# Patient Record
Sex: MALE | Race: BLACK OR AFRICAN AMERICAN | NOT HISPANIC OR LATINO | Employment: PART TIME | URBAN - METROPOLITAN AREA
[De-identification: names, ages, dates, MRNs, and addresses within clinical notes are randomized per-mention and may not be internally consistent; named-entity substitution may affect disease eponyms.]

---
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
## 2021-03-14 ENCOUNTER — HOSPITAL ENCOUNTER (EMERGENCY)
Facility: HOSPITAL | Age: 21
Discharge: HOME | End: 2021-03-14
Attending: EMERGENCY MEDICINE | Admitting: EMERGENCY MEDICINE
Payer: COMMERCIAL

## 2021-03-14 ENCOUNTER — APPOINTMENT (EMERGENCY)
Dept: ULTRASOUND IMAGING | Facility: HOSPITAL | Age: 21
End: 2021-03-14
Payer: COMMERCIAL

## 2021-03-14 VITALS
BODY MASS INDEX: 26.21 KG/M2 | RESPIRATION RATE: 16 BRPM | TEMPERATURE: 97.8 F | OXYGEN SATURATION: 100 % | HEIGHT: 67 IN | WEIGHT: 167 LBS | HEART RATE: 92 BPM | SYSTOLIC BLOOD PRESSURE: 117 MMHG | DIASTOLIC BLOOD PRESSURE: 55 MMHG

## 2021-03-14 DIAGNOSIS — R10.13 EPIGASTRIC PAIN: ICD-10-CM

## 2021-03-14 DIAGNOSIS — R11.2 NAUSEA AND VOMITING: Primary | ICD-10-CM

## 2021-03-14 DIAGNOSIS — F12.90 MARIJUANA USE: ICD-10-CM

## 2021-03-14 LAB
ALBUMIN SERPL BCP-MCNC: 5 G/DL (ref 3.4–4.8)
ALP SERPL-CCNC: 43.4 U/L (ref 10–129)
ALT SERPL W P-5'-P-CCNC: 22 U/L (ref 5–63)
ANION GAP SERPL CALCULATED.3IONS-SCNC: 10 MMOL/L (ref 4–13)
AST SERPL W P-5'-P-CCNC: 20 U/L (ref 15–41)
BACTERIA UR QL AUTO: ABNORMAL /HPF
BASOPHILS # BLD AUTO: 0.01 THOUSANDS/ΜL (ref 0–0.1)
BASOPHILS NFR BLD AUTO: 0 % (ref 0–1)
BILIRUB SERPL-MCNC: 0.87 MG/DL (ref 0.3–1.2)
BILIRUB UR QL STRIP: NEGATIVE
BUN SERPL-MCNC: 19 MG/DL (ref 6–20)
CALCIUM SERPL-MCNC: 10.3 MG/DL (ref 8.4–10.2)
CHLORIDE SERPL-SCNC: 104 MMOL/L (ref 96–108)
CLARITY UR: CLEAR
CO2 SERPL-SCNC: 25 MMOL/L (ref 22–33)
COLOR UR: YELLOW
CREAT SERPL-MCNC: 1.17 MG/DL (ref 0.5–1.2)
EOSINOPHIL # BLD AUTO: 0.03 THOUSAND/ΜL (ref 0–0.61)
EOSINOPHIL NFR BLD AUTO: 0 % (ref 0–6)
ERYTHROCYTE [DISTWIDTH] IN BLOOD BY AUTOMATED COUNT: 13.4 % (ref 11.6–15.1)
GFR SERPL CREATININE-BSD FRML MDRD: 102 ML/MIN/1.73SQ M
GLUCOSE SERPL-MCNC: 119 MG/DL (ref 65–140)
GLUCOSE UR STRIP-MCNC: NEGATIVE MG/DL
HCT VFR BLD AUTO: 45.7 % (ref 36.5–49.3)
HGB BLD-MCNC: 15.2 G/DL (ref 12–17)
HGB UR QL STRIP.AUTO: NEGATIVE
IMM GRANULOCYTES # BLD AUTO: 0.02 THOUSAND/UL (ref 0–0.2)
IMM GRANULOCYTES NFR BLD AUTO: 0 % (ref 0–2)
KETONES UR STRIP-MCNC: ABNORMAL MG/DL
LEUKOCYTE ESTERASE UR QL STRIP: NEGATIVE
LIPASE SERPL-CCNC: 73 U/L (ref 13–60)
LYMPHOCYTES # BLD AUTO: 0.32 THOUSANDS/ΜL (ref 0.6–4.47)
LYMPHOCYTES NFR BLD AUTO: 3 % (ref 14–44)
MCH RBC QN AUTO: 28.7 PG (ref 26.8–34.3)
MCHC RBC AUTO-ENTMCNC: 33.3 G/DL (ref 31.4–37.4)
MCV RBC AUTO: 86 FL (ref 82–98)
MONOCYTES # BLD AUTO: 0.38 THOUSAND/ΜL (ref 0.17–1.22)
MONOCYTES NFR BLD AUTO: 4 % (ref 4–12)
MUCOUS THREADS UR QL AUTO: ABNORMAL
NEUTROPHILS # BLD AUTO: 9 THOUSANDS/ΜL (ref 1.85–7.62)
NEUTS SEG NFR BLD AUTO: 93 % (ref 43–75)
NITRITE UR QL STRIP: NEGATIVE
NON-SQ EPI CELLS URNS QL MICRO: ABNORMAL /HPF
PH UR STRIP.AUTO: 5.5 [PH]
PLATELET # BLD AUTO: 224 THOUSANDS/UL (ref 149–390)
PMV BLD AUTO: 9.5 FL (ref 8.9–12.7)
POTASSIUM SERPL-SCNC: 4 MMOL/L (ref 3.5–5)
PROT SERPL-MCNC: 8.1 G/DL (ref 6.4–8.3)
PROT UR STRIP-MCNC: NEGATIVE MG/DL
RBC # BLD AUTO: 5.29 MILLION/UL (ref 3.88–5.62)
RBC #/AREA URNS AUTO: ABNORMAL /HPF
SODIUM SERPL-SCNC: 139 MMOL/L (ref 133–145)
SP GR UR STRIP.AUTO: 1.02 (ref 1–1.03)
UROBILINOGEN UR QL STRIP.AUTO: 0.2 E.U./DL
WBC # BLD AUTO: 9.76 THOUSAND/UL (ref 4.31–10.16)
WBC #/AREA URNS AUTO: ABNORMAL /HPF

## 2021-03-14 PROCEDURE — 85025 COMPLETE CBC W/AUTO DIFF WBC: CPT | Performed by: EMERGENCY MEDICINE

## 2021-03-14 PROCEDURE — 76870 US EXAM SCROTUM: CPT

## 2021-03-14 PROCEDURE — 96375 TX/PRO/DX INJ NEW DRUG ADDON: CPT

## 2021-03-14 PROCEDURE — 96361 HYDRATE IV INFUSION ADD-ON: CPT

## 2021-03-14 PROCEDURE — 80053 COMPREHEN METABOLIC PANEL: CPT | Performed by: EMERGENCY MEDICINE

## 2021-03-14 PROCEDURE — 99284 EMERGENCY DEPT VISIT MOD MDM: CPT | Performed by: EMERGENCY MEDICINE

## 2021-03-14 PROCEDURE — 81001 URINALYSIS AUTO W/SCOPE: CPT | Performed by: EMERGENCY MEDICINE

## 2021-03-14 PROCEDURE — 36415 COLL VENOUS BLD VENIPUNCTURE: CPT | Performed by: EMERGENCY MEDICINE

## 2021-03-14 PROCEDURE — 83690 ASSAY OF LIPASE: CPT | Performed by: EMERGENCY MEDICINE

## 2021-03-14 PROCEDURE — 99284 EMERGENCY DEPT VISIT MOD MDM: CPT

## 2021-03-14 PROCEDURE — 96374 THER/PROPH/DIAG INJ IV PUSH: CPT

## 2021-03-14 RX ORDER — ONDANSETRON 2 MG/ML
4 INJECTION INTRAMUSCULAR; INTRAVENOUS ONCE
Status: COMPLETED | OUTPATIENT
Start: 2021-03-14 | End: 2021-03-14

## 2021-03-14 RX ORDER — MORPHINE SULFATE 4 MG/ML
4 INJECTION, SOLUTION INTRAMUSCULAR; INTRAVENOUS ONCE
Status: COMPLETED | OUTPATIENT
Start: 2021-03-14 | End: 2021-03-14

## 2021-03-14 RX ORDER — ONDANSETRON 4 MG/1
4 TABLET, ORALLY DISINTEGRATING ORAL EVERY 6 HOURS PRN
Qty: 12 TABLET | Refills: 0 | Status: SHIPPED | OUTPATIENT
Start: 2021-03-14

## 2021-03-14 RX ORDER — PANTOPRAZOLE SODIUM 40 MG/1
40 TABLET, DELAYED RELEASE ORAL DAILY
Qty: 30 TABLET | Refills: 0 | Status: SHIPPED | OUTPATIENT
Start: 2021-03-14

## 2021-03-14 RX ADMIN — MORPHINE SULFATE 4 MG: 4 INJECTION INTRAVENOUS at 03:37

## 2021-03-14 RX ADMIN — ONDANSETRON 4 MG: 2 INJECTION INTRAMUSCULAR; INTRAVENOUS at 03:35

## 2021-03-14 RX ADMIN — SODIUM CHLORIDE 1000 ML: 0.9 INJECTION, SOLUTION INTRAVENOUS at 03:38

## 2021-03-14 NOTE — ED PROVIDER NOTES
History  Chief Complaint   Patient presents with    Vomiting     Patient reports vomiting since earlier this evening accompanied by abdominal pain     History of left testicular torsion requiring surgery 9 years ago  Patient presents with left lower quadrant pain, radiates to the left upper quadrant  States that the lower quadrant pain does feel similar to his torsion although he does not have any testicular pain at this time  It does get so bad he vomits, has helped is time to come to the emergency department because he started vomiting more consistently  Symptoms have been coming and going since the afternoon  No fevers or chills, no diarrhea, no urinary changes  None       Past Medical History:   Diagnosis Date    Heart murmur        Past Surgical History:   Procedure Laterality Date    REDUCTION OF TORSION OF TESTIS  2011       Family History   Problem Relation Age of Onset    Rheum arthritis Mother     Breast cancer Maternal Grandmother     Breast cancer Family      I have reviewed and agree with the history as documented  E-Cigarette/Vaping    E-Cigarette Use Never User      E-Cigarette/Vaping Substances     Social History     Tobacco Use    Smoking status: Never Smoker    Smokeless tobacco: Never Used   Substance Use Topics    Alcohol use: Never     Frequency: Never    Drug use: Yes     Types: Marijuana       Review of Systems   All other systems reviewed and are negative  Physical Exam  Physical Exam  Vitals signs and nursing note reviewed  Constitutional:       Comments: Appears uncomfortable   HENT:      Head: Normocephalic and atraumatic  Nose: Nose normal       Mouth/Throat:      Mouth: Mucous membranes are moist    Eyes:      Extraocular Movements: Extraocular movements intact  Pupils: Pupils are equal, round, and reactive to light  Cardiovascular:      Rate and Rhythm: Normal rate and regular rhythm  Pulses: Normal pulses        Heart sounds: Normal heart sounds  Pulmonary:      Effort: Pulmonary effort is normal       Breath sounds: Normal breath sounds  No wheezing, rhonchi or rales  Abdominal:      General: Abdomen is flat  There is no distension  Palpations: Abdomen is soft  Tenderness: There is no rebound  Comments: Tenderness with guarding left lower quadrant, left upper quadrant   Genitourinary:     Penis: Normal        Comments: Normal testicular lie, no gross size difference, left testicle has tenderness of the posterior aspect, nontender anterior or on the sides, patient does have pain with traction on the testicle but most of his pain is with palpation along the epididymis  He states the pain does radiate up into his left lower quadrant this does feel like his prior to origin although less severe than it was in the past   right testicle is nontender  Musculoskeletal:      Right lower leg: No edema  Left lower leg: No edema  Skin:     General: Skin is warm and dry  Capillary Refill: Capillary refill takes less than 2 seconds  Neurological:      General: No focal deficit present  Mental Status: He is alert and oriented to person, place, and time  Sensory: No sensory deficit  Motor: No weakness        Gait: Gait normal    Psychiatric:         Mood and Affect: Mood normal          Behavior: Behavior normal          Vital Signs  ED Triage Vitals [03/14/21 0139]   Temperature Pulse Respirations Blood Pressure SpO2   97 8 °F (36 6 °C) 83 18 127/77 99 %      Temp Source Heart Rate Source Patient Position - Orthostatic VS BP Location FiO2 (%)   Oral Monitor Sitting Right arm --      Pain Score       9           Vitals:    03/14/21 0139 03/14/21 0638   BP: 127/77 117/55   Pulse: 83 92   Patient Position - Orthostatic VS: Sitting Lying         Visual Acuity      ED Medications  Medications   morphine (PF) 4 mg/mL injection 4 mg (4 mg Intravenous Given 3/14/21 0337)   ondansetron (ZOFRAN) injection 4 mg (4 mg Intravenous Given 3/14/21 0335)   sodium chloride 0 9 % bolus 1,000 mL (0 mL Intravenous Stopped 3/14/21 0625)       Diagnostic Studies  Results Reviewed     Procedure Component Value Units Date/Time    Urinalysis with microscopic [[id number]]  (Abnormal) Collected: 03/14/21 0625    Lab Status: Final result Specimen: Urine, Clean Catch Updated: 03/14/21 0707     Clarity, UA Clear     Color, UA Yellow     Specific Gravity, UA 1 025     pH, UA 5 5     Glucose, UA Negative mg/dl      Ketones, UA Trace mg/dl      Blood, UA Negative     Protein, UA Negative mg/dl      Nitrite, UA Negative     Bilirubin, UA Negative     Urobilinogen, UA 0 2 E U /dl      Leukocytes, UA Negative     WBC, UA 0-1 /hpf      RBC, UA 0-1 /hpf      Bacteria, UA None Seen /hpf      Epithelial Cells Occasional /hpf      MUCUS THREADS Moderate    CBC and differential [[id number]]  (Abnormal) Collected: 03/14/21 0322    Lab Status: Final result Specimen: Blood from Arm, Right Updated: 03/14/21 0418     WBC 9 76 Thousand/uL      RBC 5 29 Million/uL      Hemoglobin 15 2 g/dL      Hematocrit 45 7 %      MCV 86 fL      MCH 28 7 pg      MCHC 33 3 g/dL      RDW 13 4 %      MPV 9 5 fL      Platelets 808 Thousands/uL      Neutrophils Relative 93 %      Immat GRANS % 0 %      Lymphocytes Relative 3 %      Monocytes Relative 4 %      Eosinophils Relative 0 %      Basophils Relative 0 %      Neutrophils Absolute 9 00 Thousands/µL      Immature Grans Absolute 0 02 Thousand/uL      Lymphocytes Absolute 0 32 Thousands/µL      Monocytes Absolute 0 38 Thousand/µL      Eosinophils Absolute 0 03 Thousand/µL      Basophils Absolute 0 01 Thousands/µL     Narrative: This is an appended report  These results have been appended to a previously verified report      Comprehensive metabolic panel [[id number]]  (Abnormal) Collected: 03/14/21 0322    Lab Status: Final result Specimen: Blood from Arm, Right Updated: 03/14/21 0350     Sodium 139 mmol/L      Potassium 4 0 mmol/L Chloride 104 mmol/L      CO2 25 mmol/L      ANION GAP 10 mmol/L      BUN 19 mg/dL      Creatinine 1 17 mg/dL      Glucose 119 mg/dL      Calcium 10 3 mg/dL      AST 20 U/L      ALT 22 U/L      Alkaline Phosphatase 43 4 U/L      Total Protein 8 1 g/dL      Albumin 5 0 g/dL      Total Bilirubin 0 87 mg/dL      eGFR 102 ml/min/1 73sq m     Narrative:      [place] guidelines for Chronic Kidney Disease (CKD):     Stage 1 with normal or high GFR (GFR > 90 mL/min/1 73 square meters)    Stage 2 Mild CKD (GFR = 60-89 mL/min/1 73 square meters)    Stage 3A Moderate CKD (GFR = 45-59 mL/min/1 73 square meters)    Stage 3B Moderate CKD (GFR = 30-44 mL/min/1 73 square meters)    Stage 4 Severe CKD (GFR = 15-29 mL/min/1 73 square meters)    Stage 5 End Stage CKD (GFR <15 mL/min/1 73 square meters)  Note: GFR calculation is accurate only with a steady state creatinine    Lipase [[id number]]  (Abnormal) Collected: 03/14/21 0322    Lab Status: Final result Specimen: Blood from Arm, Right Updated: 03/14/21 0350     Lipase 73 u/L                  US scrotum and testicles   Final Result by Becca Hassan MD (03/14 0546)       Asymmetry of the left testicular parenchyma without decreased or increased flow; I suspect this is secondary to prior torsion however clinical correlation is advised  Otherwise unremarkable ultrasound of the testicles  Symmetric flow is noted within the testicles  Epididymides are symmetric  I personally discussed this study with Nicol Wilcox on 3/14/2021 at 5:33 AM           Workstation performed: [id number]                 Procedures  Procedures         ED Course  ED Course as of Mar 15 0801   Jeanetteyolanda Castlesusanna Mar 14, 2021   6988 Normal white blood cell count, hemoglobin and platelets  Normal electrolytes and kidney function  Normal liver function testing  Lipase very mildly elevated at 73      0632 Ultrasound is negative for acute injury, patient does have some changes consistent with scarring from his prior surgery  Discussed results with patient, he is feeling completely better after medications here  No pain remains in his abdomen or his testicles  Discussed results, patient will give a urine sample to see if there is infectious cause of his pain  On repeat abdominal exam, he is soft and nontender to palpation  MDM    Disposition  Final diagnoses:   Nausea and vomiting   Epigastric pain   Marijuana use     Time reflects when diagnosis was documented in both MDM as applicable and the Disposition within this note     Time User Action Codes Description Comment    3/14/2021  7:25 AM Juan An Add [R11 2] Nausea and vomiting     3/14/2021  7:25 AM Giulia Crest Add [R10 13] Epigastric pain     3/14/2021  7:25 AM Giulia Crest Add [F12 90] Marijuana use       ED Disposition     ED Disposition Condition Date/Time Comment    Discharge Stable Sun Mar 14, 2021  7:25 AM Marek Al discharge to home/self care  Follow-up Information     Follow up With Specialties Details Why Contact Info    Elsi Sagastume MD Family Medicine In 3 days  [address]   [phone]            Discharge Medication List as of 3/14/2021  7:26 AM      START taking these medications    Details   ondansetron (ZOFRAN-ODT) 4 mg disintegrating tablet Take 1 tablet (4 mg total) by mouth every 6 (six) hours as needed for nausea or vomiting for up to 12 doses, Starting Sun 3/14/2021, Normal      pantoprazole (PROTONIX) 40 mg tablet Take 1 tablet (40 mg total) by mouth daily, Starting Sun 3/14/2021, Normal           No discharge procedures on file      PDMP Review     None          ED Provider  Electronically Signed by           Ivana Gonzalez DO  03/15/21 3231

## 2021-03-14 NOTE — ED CARE HANDOFF
Emergency Department Sign Out Note        Sign out and transfer of care from Dr Mckenna Brooks  See Separate Emergency Department note  The patient, Ciera Colindres, was evaluated by the previous provider for vomiting and abd pain  Workup Completed:  Scrotal US, labs, urine    ED Course / Workup Pending (followup): Workup unremarkable  Repeat abd exam normal, labs and urine, US wnl  Likely gastroenteritis vs  Marijuana hyperemesis syndrome  Stable for d/c home  ED Course as of Mar 14 1135   Mina Potter Mar 14, 2021   0719 Patient signed out to me, symptoms resolved, urine negative, abdomen nontender for me on repeat abd exam   He does admit to marijuana use daily, discussed this may be related to it  Advised to stop or at least cut back marijuana use  Procedures  MDM    Disposition  Final diagnoses:   Nausea and vomiting   Epigastric pain   Marijuana use     Time reflects when diagnosis was documented in both MDM as applicable and the Disposition within this note     Time User Action Codes Description Comment    3/14/2021  7:25 AM Janelle Oneill Add [R11 2] Nausea and vomiting     3/14/2021  7:25 AM Clif Parr Add [R10 13] Epigastric pain     3/14/2021  7:25 AM Clif Parr Add [F12 90] Marijuana use       ED Disposition     ED Disposition Condition Date/Time Comment    Discharge Stable Sun Mar 14, 2021  7:25 AM Ciera Colindres discharge to home/self care              Follow-up Information     Follow up With Specialties Details Why Contact Info    Alonso Lizama MD Family Medicine In 3 days  [address]   [phone]          Discharge Medication List as of 3/14/2021  7:26 AM      START taking these medications    Details   ondansetron (ZOFRAN-ODT) 4 mg disintegrating tablet Take 1 tablet (4 mg total) by mouth every 6 (six) hours as needed for nausea or vomiting for up to 12 doses, Starting Sun 3/14/2021, Normal      pantoprazole (PROTONIX) 40 mg tablet Take 1 tablet (40 mg total) by mouth daily, Starting Sun 3/14/2021, Normal           No discharge procedures on file         ED Provider  Electronically Signed by     Moriah Rivera DO  03/14/21 4771

## 2021-06-17 ENCOUNTER — HOSPITAL ENCOUNTER (EMERGENCY)
Facility: HOSPITAL | Age: 21
Discharge: HOME | End: 2021-06-17
Payer: COMMERCIAL

## 2021-06-17 VITALS
TEMPERATURE: 99.4 F | HEIGHT: 68 IN | HEART RATE: 91 BPM | SYSTOLIC BLOOD PRESSURE: 117 MMHG | RESPIRATION RATE: 16 BRPM | BODY MASS INDEX: 25.13 KG/M2 | DIASTOLIC BLOOD PRESSURE: 69 MMHG | OXYGEN SATURATION: 95 % | WEIGHT: 165.79 LBS

## 2021-06-17 DIAGNOSIS — Z20.822 SUSPECTED COVID-19 VIRUS INFECTION: ICD-10-CM

## 2021-06-17 DIAGNOSIS — Z20.822 ENCOUNTER FOR LABORATORY TESTING FOR COVID-19 VIRUS: Primary | ICD-10-CM

## 2021-06-17 LAB — SARS-COV-2 RNA RESP QL NAA+PROBE: NEGATIVE

## 2021-06-17 PROCEDURE — 99282 EMERGENCY DEPT VISIT SF MDM: CPT

## 2021-06-17 PROCEDURE — 99282 EMERGENCY DEPT VISIT SF MDM: CPT | Performed by: PHYSICIAN ASSISTANT

## 2021-06-17 PROCEDURE — 87635 SARS-COV-2 COVID-19 AMP PRB: CPT | Performed by: PHYSICIAN ASSISTANT

## 2021-06-17 NOTE — ED PROVIDER NOTES
History  Chief Complaint   Patient presents with    Labs Only     Pt here for COVID testing since girlfriend lost sense of taste  Pt reports sore throat, aches, and chills  Patient is a 44-year-old male presenting to the ED for a COVID test   Patient has had 2 days of a sore throat, cough, body aches, chills and congestion  Yesterday his girlfriend lost her sense of taste and he thinks she might have COVID  Patient said that his symptoms already starting to improve  He denies fevers, chest pain, shortness of breath, abdominal pain, nausea, vomiting, diarrhea constipation  Patient is requesting a COVID test only  Prior to Admission Medications   Prescriptions Last Dose Informant Patient Reported? Taking?   ondansetron (ZOFRAN-ODT) 4 mg disintegrating tablet Not Taking at Unknown time  No No   Sig: Take 1 tablet (4 mg total) by mouth every 6 (six) hours as needed for nausea or vomiting for up to 12 doses   Patient not taking: Reported on 6/17/2021   pantoprazole (PROTONIX) 40 mg tablet Not Taking at Unknown time  No No   Sig: Take 1 tablet (40 mg total) by mouth daily   Patient not taking: Reported on 6/17/2021      Facility-Administered Medications: None       Past Medical History:   Diagnosis Date    Heart murmur        Past Surgical History:   Procedure Laterality Date    REDUCTION OF TORSION OF TESTIS  2011       Family History   Problem Relation Age of Onset    Rheum arthritis Mother     Breast cancer Maternal Grandmother     Breast cancer Family      I have reviewed and agree with the history as documented      E-Cigarette/Vaping    E-Cigarette Use Never User      E-Cigarette/Vaping Substances     Social History     Tobacco Use    Smoking status: Never Smoker    Smokeless tobacco: Never Used   Vaping Use    Vaping Use: Never used   Substance Use Topics    Alcohol use: Never    Drug use: Yes     Types: Marijuana     Comment: daily       Review of Systems   Constitutional: Positive for chills  Negative for diaphoresis, fatigue and fever  HENT: Positive for congestion and sore throat  Negative for ear pain, mouth sores, rhinorrhea, sinus pain and trouble swallowing  Eyes: Negative for photophobia and visual disturbance  Respiratory: Positive for cough  Negative for chest tightness, shortness of breath and wheezing  Cardiovascular: Negative for chest pain, palpitations and leg swelling  Gastrointestinal: Negative for abdominal pain, blood in stool, constipation, diarrhea, nausea and vomiting  Genitourinary: Negative for dysuria, flank pain, frequency, hematuria and urgency  Musculoskeletal: Positive for myalgias  Negative for arthralgias, back pain, gait problem, joint swelling and neck pain  Skin: Negative for color change, pallor and rash  Neurological: Negative for dizziness, syncope, speech difficulty, weakness, light-headedness, numbness and headaches  Psychiatric/Behavioral: Negative for confusion and sleep disturbance  All other systems reviewed and are negative  Physical Exam  Physical Exam  Vitals and nursing note reviewed  Constitutional:       General: He is awake  He is not in acute distress  Appearance: Normal appearance  He is well-developed  He is not ill-appearing or diaphoretic  HENT:      Head: Normocephalic and atraumatic  Right Ear: External ear normal       Left Ear: External ear normal       Nose: Nose normal       Mouth/Throat:      Lips: Pink  Mouth: Mucous membranes are moist       Pharynx: Oropharynx is clear  Uvula midline  No pharyngeal swelling, oropharyngeal exudate or posterior oropharyngeal erythema  Comments: No pharyngeal erythema or exudate  Uvula midline  Eyes:      General: Lids are normal  No scleral icterus  Conjunctiva/sclera: Conjunctivae normal       Pupils: Pupils are equal, round, and reactive to light  Cardiovascular:      Rate and Rhythm: Normal rate and regular rhythm        Pulses: Normal pulses  Radial pulses are 2+ on the right side and 2+ on the left side  Heart sounds: Normal heart sounds, S1 normal and S2 normal    Pulmonary:      Effort: Pulmonary effort is normal  No tachypnea, accessory muscle usage or respiratory distress  Breath sounds: Normal breath sounds  No stridor  No decreased breath sounds, wheezing, rhonchi or rales  Comments: Lungs clear and equal to auscultation bilaterally  Abdominal:      General: Abdomen is flat  Bowel sounds are normal  There is no distension  Palpations: Abdomen is soft  Tenderness: There is no abdominal tenderness  There is no right CVA tenderness, left CVA tenderness, guarding or rebound  Musculoskeletal:      Cervical back: Full passive range of motion without pain, normal range of motion and neck supple  No signs of trauma  No pain with movement  Normal range of motion  Right lower leg: No edema  Left lower leg: No edema  Lymphadenopathy:      Cervical: No cervical adenopathy  Skin:     General: Skin is warm and dry  Capillary Refill: Capillary refill takes less than 2 seconds  Coloration: Skin is not cyanotic, jaundiced or pale  Neurological:      Mental Status: He is alert and oriented to person, place, and time  GCS: GCS eye subscore is 4  GCS verbal subscore is 5  GCS motor subscore is 6  Cranial Nerves: No dysarthria or facial asymmetry  Gait: Gait normal    Psychiatric:         Attention and Perception: Attention normal          Mood and Affect: Mood normal          Speech: Speech normal          Behavior: Behavior normal  Behavior is cooperative           Vital Signs  ED Triage Vitals [06/17/21 1901]   Temperature Pulse Respirations Blood Pressure SpO2   99 4 °F (37 4 °C) 91 16 117/69 95 %      Temp Source Heart Rate Source Patient Position - Orthostatic VS BP Location FiO2 (%)   Oral Monitor Lying Left arm --      Pain Score       --           Vitals:    06/17/21 1901 BP: 117/69   Pulse: 91   Patient Position - Orthostatic VS: Lying         Visual Acuity      ED Medications  Medications - No data to display    Diagnostic Studies  Results Reviewed     Procedure Component Value Units Date/Time    Novel Coronavirus (Covid-19),PCR [facility] - 24 Hour Routine [[id number]] Collected: 06/17/21 1915    Lab Status: In process Specimen: Nares from Nasopharyngeal Swab Updated: 06/17/21 1921                 No orders to display              Procedures  Procedures         ED Course                             SBIRT 20yo+      Most Recent Value   SBIRT (23 yo +)   In order to provide better care to our patients, we are screening all of our patients for alcohol and drug use  Would it be okay to ask you these screening questions? No Filed at: 06/17/2021 1914                    MDM  Number of Diagnoses or Management Options  Encounter for laboratory testing for COVID-19 virus  Suspected COVID-19 virus infection  Diagnosis management comments: Patient is a 26-year-old male presenting to the ED for a COVID test   COVID swab obtained  Quarantine precautions discussed in detail  The management plan was discussed in detail with the patient at bedside and all questions were answered  Prior to discharge, verbal and written instructions provided  ED return precautions discussed in detail  The patient verbalized understanding of our discussion and plan of care, and agrees to return to the Emergency Department for concerns and progression of illness         Amount and/or Complexity of Data Reviewed  Clinical lab tests: ordered    Patient Progress  Patient progress: stable      Disposition  Final diagnoses:   Encounter for laboratory testing for COVID-19 virus   Suspected COVID-19 virus infection     Time reflects when diagnosis was documented in both MDM as applicable and the Disposition within this note     Time User Action Codes Description Comment    6/17/2021  7:15 PM Randi Clark Add [Z20 232] Encounter for laboratory testing for COVID-19 virus     6/17/2021  7:16 PM Yuri Rahman [Z20 822] Suspected COVID-19 virus infection       ED Disposition     ED Disposition Condition Date/Time Comment    Discharge Stable u Jun 17, 2021  7:15 PM Gillian Enrique discharge to home/self care  Follow-up Information     Follow up With Specialties Details Why Contact Info Additional Information    Grisel Leblanc MD Family Medicine  As needed [address] [facility] [place]  [address] [facility]       [address] Emergency Department Emergency Medicine  If symptoms worsen [address] [facility],[address] [id number]  [facility] Emergency Department, [address], [address]          Discharge Medication List as of 6/17/2021  7:18 PM      CONTINUE these medications which have NOT CHANGED    Details   ondansetron (ZOFRAN-ODT) 4 mg disintegrating tablet Take 1 tablet (4 mg total) by mouth every 6 (six) hours as needed for nausea or vomiting for up to 12 doses, Starting Sun 3/14/2021, Normal      pantoprazole (PROTONIX) 40 mg tablet Take 1 tablet (40 mg total) by mouth daily, Starting Sun 3/14/2021, Normal           No discharge procedures on file      PDMP Review     None          ED Provider  Electronically Signed by           Rizwana Gunn PA-C  06/17/21 8282

## 2021-06-17 NOTE — Clinical Note
Kirti Bert was seen and treated in our emergency department on 6/17/2021  Diagnosis:     Juan Manuel An    He may return on this date: If you have any questions or concerns, please don't hesitate to call        Deni Hooker PA-C    ______________________________           _______________          _______________  Hospital Representative                              Date                                Time

## 2023-08-26 ENCOUNTER — HOSPITAL ENCOUNTER (EMERGENCY)
Facility: HOSPITAL | Age: 23
Discharge: HOME | End: 2023-08-26
Attending: EMERGENCY MEDICINE
Payer: COMMERCIAL

## 2023-08-26 ENCOUNTER — OFFICE VISIT (OUTPATIENT)
Dept: URGENT CARE | Facility: MEDICAL CENTER | Age: 23
End: 2023-08-26
Payer: COMMERCIAL

## 2023-08-26 ENCOUNTER — APPOINTMENT (EMERGENCY)
Dept: NON INVASIVE DIAGNOSTICS | Facility: HOSPITAL | Age: 23
End: 2023-08-26
Payer: COMMERCIAL

## 2023-08-26 ENCOUNTER — APPOINTMENT (EMERGENCY)
Dept: RADIOLOGY | Facility: HOSPITAL | Age: 23
End: 2023-08-26
Payer: COMMERCIAL

## 2023-08-26 VITALS
SYSTOLIC BLOOD PRESSURE: 148 MMHG | DIASTOLIC BLOOD PRESSURE: 88 MMHG | TEMPERATURE: 97.9 F | OXYGEN SATURATION: 99 % | RESPIRATION RATE: 20 BRPM | HEART RATE: 72 BPM | BODY MASS INDEX: 25.94 KG/M2 | WEIGHT: 170.6 LBS

## 2023-08-26 VITALS
SYSTOLIC BLOOD PRESSURE: 119 MMHG | HEART RATE: 62 BPM | TEMPERATURE: 97.3 F | OXYGEN SATURATION: 96 % | DIASTOLIC BLOOD PRESSURE: 55 MMHG | RESPIRATION RATE: 15 BRPM

## 2023-08-26 DIAGNOSIS — R74.8 ELEVATED CK: ICD-10-CM

## 2023-08-26 DIAGNOSIS — M79.661 RIGHT CALF PAIN: Primary | ICD-10-CM

## 2023-08-26 DIAGNOSIS — M79.604 RIGHT LEG PAIN: Primary | ICD-10-CM

## 2023-08-26 DIAGNOSIS — R79.89 ELEVATED LFTS: ICD-10-CM

## 2023-08-26 LAB
ALBUMIN SERPL BCP-MCNC: 4.5 G/DL (ref 3.5–5)
ALP SERPL-CCNC: 42 U/L (ref 34–104)
ALT SERPL W P-5'-P-CCNC: 58 U/L (ref 7–52)
ANION GAP SERPL CALCULATED.3IONS-SCNC: 7 MMOL/L
APTT PPP: 25 SECONDS (ref 23–37)
AST SERPL W P-5'-P-CCNC: 162 U/L (ref 13–39)
ATRIAL RATE: 67 BPM
BASOPHILS # BLD AUTO: 0.03 THOUSANDS/ÂΜL (ref 0–0.1)
BASOPHILS NFR BLD AUTO: 1 % (ref 0–1)
BILIRUB DIRECT SERPL-MCNC: 0.1 MG/DL (ref 0–0.2)
BILIRUB SERPL-MCNC: 0.45 MG/DL (ref 0.2–1)
BILIRUB UR QL STRIP: NEGATIVE
BUN SERPL-MCNC: 13 MG/DL (ref 5–25)
CALCIUM SERPL-MCNC: 9.5 MG/DL (ref 8.4–10.2)
CARDIAC TROPONIN I PNL SERPL HS: 13 NG/L (ref 8–18)
CHLORIDE SERPL-SCNC: 103 MMOL/L (ref 96–108)
CK SERPL-CCNC: 8384 U/L (ref 39–308)
CLARITY UR: CLEAR
CO2 SERPL-SCNC: 30 MMOL/L (ref 21–32)
COLOR UR: YELLOW
CREAT SERPL-MCNC: 1.05 MG/DL (ref 0.6–1.3)
D DIMER PPP FEU-MCNC: 0.48 UG/ML FEU
EOSINOPHIL # BLD AUTO: 0.14 THOUSAND/ÂΜL (ref 0–0.61)
EOSINOPHIL NFR BLD AUTO: 4 % (ref 0–6)
ERYTHROCYTE [DISTWIDTH] IN BLOOD BY AUTOMATED COUNT: 12.5 % (ref 11.6–15.1)
GFR SERPL CREATININE-BSD FRML MDRD: 99 ML/MIN/1.73SQ M
GLUCOSE SERPL-MCNC: 78 MG/DL (ref 65–140)
GLUCOSE UR STRIP-MCNC: NEGATIVE MG/DL
HCT VFR BLD AUTO: 43.1 % (ref 36.5–49.3)
HGB BLD-MCNC: 13.8 G/DL (ref 12–17)
HGB UR QL STRIP.AUTO: NEGATIVE
IMM GRANULOCYTES # BLD AUTO: 0 THOUSAND/UL (ref 0–0.2)
IMM GRANULOCYTES NFR BLD AUTO: 0 % (ref 0–2)
INR PPP: 0.91 (ref 0.84–1.19)
KETONES UR STRIP-MCNC: NEGATIVE MG/DL
LEUKOCYTE ESTERASE UR QL STRIP: NEGATIVE
LYMPHOCYTES # BLD AUTO: 1.64 THOUSANDS/ÂΜL (ref 0.6–4.47)
LYMPHOCYTES NFR BLD AUTO: 41 % (ref 14–44)
MCH RBC QN AUTO: 29.2 PG (ref 26.8–34.3)
MCHC RBC AUTO-ENTMCNC: 32 G/DL (ref 31.4–37.4)
MCV RBC AUTO: 91 FL (ref 82–98)
MONOCYTES # BLD AUTO: 0.49 THOUSAND/ÂΜL (ref 0.17–1.22)
MONOCYTES NFR BLD AUTO: 13 % (ref 4–12)
NEUTROPHILS # BLD AUTO: 1.63 THOUSANDS/ÂΜL (ref 1.85–7.62)
NEUTS SEG NFR BLD AUTO: 41 % (ref 43–75)
NITRITE UR QL STRIP: NEGATIVE
NRBC BLD AUTO-RTO: 0 /100 WBCS
P AXIS: 68 DEGREES
PH UR STRIP.AUTO: 6.5 [PH]
PLATELET # BLD AUTO: 260 THOUSANDS/UL (ref 149–390)
PMV BLD AUTO: 9.3 FL (ref 8.9–12.7)
POTASSIUM SERPL-SCNC: 3.7 MMOL/L (ref 3.5–5.3)
PR INTERVAL: 162 MS
PROT SERPL-MCNC: 7.2 G/DL (ref 6.4–8.4)
PROT UR STRIP-MCNC: NEGATIVE MG/DL
PROTHROMBIN TIME: 12.4 SECONDS (ref 11.6–14.5)
QRS AXIS: 65 DEGREES
QRSD INTERVAL: 82 MS
QT INTERVAL: 370 MS
QTC INTERVAL: 390 MS
RBC # BLD AUTO: 4.72 MILLION/UL (ref 3.88–5.62)
SODIUM SERPL-SCNC: 140 MMOL/L (ref 135–147)
SP GR UR STRIP.AUTO: <=1.005 (ref 1–1.03)
T WAVE AXIS: 48 DEGREES
UROBILINOGEN UR QL STRIP.AUTO: 0.2 E.U./DL
VENTRICULAR RATE: 67 BPM
WBC # BLD AUTO: 3.93 THOUSAND/UL (ref 4.31–10.16)

## 2023-08-26 PROCEDURE — 85379 FIBRIN DEGRADATION QUANT: CPT | Performed by: EMERGENCY MEDICINE

## 2023-08-26 PROCEDURE — 93971 EXTREMITY STUDY: CPT

## 2023-08-26 PROCEDURE — 99284 EMERGENCY DEPT VISIT MOD MDM: CPT

## 2023-08-26 PROCEDURE — 82550 ASSAY OF CK (CPK): CPT | Performed by: EMERGENCY MEDICINE

## 2023-08-26 PROCEDURE — 80076 HEPATIC FUNCTION PANEL: CPT | Performed by: EMERGENCY MEDICINE

## 2023-08-26 PROCEDURE — 99285 EMERGENCY DEPT VISIT HI MDM: CPT | Performed by: EMERGENCY MEDICINE

## 2023-08-26 PROCEDURE — 93005 ELECTROCARDIOGRAM TRACING: CPT

## 2023-08-26 PROCEDURE — 81003 URINALYSIS AUTO W/O SCOPE: CPT | Performed by: EMERGENCY MEDICINE

## 2023-08-26 PROCEDURE — 80048 BASIC METABOLIC PNL TOTAL CA: CPT | Performed by: EMERGENCY MEDICINE

## 2023-08-26 PROCEDURE — 93010 ELECTROCARDIOGRAM REPORT: CPT | Performed by: INTERNAL MEDICINE

## 2023-08-26 PROCEDURE — 71045 X-RAY EXAM CHEST 1 VIEW: CPT

## 2023-08-26 PROCEDURE — 85025 COMPLETE CBC W/AUTO DIFF WBC: CPT | Performed by: EMERGENCY MEDICINE

## 2023-08-26 PROCEDURE — 84484 ASSAY OF TROPONIN QUANT: CPT | Performed by: EMERGENCY MEDICINE

## 2023-08-26 PROCEDURE — G0382 LEV 3 HOSP TYPE B ED VISIT: HCPCS | Performed by: PHYSICIAN ASSISTANT

## 2023-08-26 PROCEDURE — 93971 EXTREMITY STUDY: CPT | Performed by: SURGERY

## 2023-08-26 PROCEDURE — 99211 OFF/OP EST MAY X REQ PHY/QHP: CPT | Performed by: PHYSICIAN ASSISTANT

## 2023-08-26 PROCEDURE — 85610 PROTHROMBIN TIME: CPT | Performed by: EMERGENCY MEDICINE

## 2023-08-26 PROCEDURE — 36415 COLL VENOUS BLD VENIPUNCTURE: CPT | Performed by: EMERGENCY MEDICINE

## 2023-08-26 PROCEDURE — 85730 THROMBOPLASTIN TIME PARTIAL: CPT | Performed by: EMERGENCY MEDICINE

## 2023-08-26 PROCEDURE — 96365 THER/PROPH/DIAG IV INF INIT: CPT

## 2023-08-26 RX ORDER — SODIUM CHLORIDE, SODIUM GLUCONATE, SODIUM ACETATE, POTASSIUM CHLORIDE, MAGNESIUM CHLORIDE, SODIUM PHOSPHATE, DIBASIC, AND POTASSIUM PHOSPHATE .53; .5; .37; .037; .03; .012; .00082 G/100ML; G/100ML; G/100ML; G/100ML; G/100ML; G/100ML; G/100ML
1000 INJECTION, SOLUTION INTRAVENOUS ONCE
Status: COMPLETED | OUTPATIENT
Start: 2023-08-26 | End: 2023-08-26

## 2023-08-26 RX ADMIN — SODIUM CHLORIDE, SODIUM GLUCONATE, SODIUM ACETATE, POTASSIUM CHLORIDE, MAGNESIUM CHLORIDE, SODIUM PHOSPHATE, DIBASIC, AND POTASSIUM PHOSPHATE 1000 ML: .53; .5; .37; .037; .03; .012; .00082 INJECTION, SOLUTION INTRAVENOUS at 12:50

## 2023-08-26 NOTE — PROGRESS NOTES
[facility]        NAME: Vilma Estrada is a 21 y.o. male  : 2000    MRN: [id number]  DATE: 2023  TIME: 11:49 AM    Assessment and Plan   Right calf pain [M79.661]  1. Right calf pain  Transfer to other facility            Patient Instructions       Go directly to the ER for further evaluation. Chief Complaint     Chief Complaint   Patient presents with   • pain in calf and in chest with deep inspiration     Started while away then drove 15 hours home         History of Present Illness       Was seen in the emergency room in [place] 2 days ago where he was diagnosed with acute kidney injury secondary to rhabdomyolysis. ER wanted to admit him,however, he was there for employment and did not have a ride back to [place]. ER discharged him with recommendations for follow-up labs. Had a 15-hour drive back to [place] and is now presenting with right calf pain and right sided chest pain. No FHx of medical issues. Review of Systems   Review of Systems   Constitutional: Negative for chills and fatigue. Respiratory: Negative for shortness of breath. Cardiovascular: Positive for chest pain. Negative for palpitations. Genitourinary: Negative for difficulty urinating.    Musculoskeletal:        Right calf pain         Current Medications       Current Outpatient Medications:   •  ondansetron (ZOFRAN-ODT) 4 mg disintegrating tablet, Take 1 tablet (4 mg total) by mouth every 6 (six) hours as needed for nausea or vomiting for up to 12 doses (Patient not taking: Reported on 2021), Disp: 12 tablet, Rfl: 0  •  pantoprazole (PROTONIX) 40 mg tablet, Take 1 tablet (40 mg total) by mouth daily (Patient not taking: Reported on 2021), Disp: 30 tablet, Rfl: 0    Current Allergies     Allergies as of 2023   • (No Known Allergies)            The following portions of the patient's history were reviewed and updated as appropriate: allergies, current medications, past family history, past medical history, past social history, past surgical history and problem list.     Past Medical History:   Diagnosis Date   • Heart murmur        Past Surgical History:   Procedure Laterality Date   • REDUCTION OF TORSION OF TESTIS  2011       Family History   Problem Relation Age of Onset   • Rheum arthritis Mother    • Breast cancer Maternal Grandmother    • Breast cancer Family          Medications have been verified. Objective   /88   Pulse 72   Temp 97.9 °F (36.6 °C)   Resp 20   Wt 77.4 kg (170 lb 9.6 oz)   SpO2 99%   BMI 25.94 kg/m²   No LMP for male patient. Physical Exam     Physical Exam  Vitals and nursing note reviewed. Constitutional:       Appearance: Normal appearance. HENT:      Head: Normocephalic and atraumatic. Cardiovascular:      Rate and Rhythm: Normal rate and regular rhythm. Heart sounds: Normal heart sounds. Pulmonary:      Effort: Pulmonary effort is normal.      Breath sounds: Normal breath sounds. Musculoskeletal:      Comments: No RLE edema or palpable cords   Skin:     General: Skin is warm. Neurological:      Mental Status: He is alert.

## 2023-08-26 NOTE — DISCHARGE INSTRUCTIONS
Tylenol 650mg every 6 hours as needed for pain, fever (max 3000mg in 24 hours)   No ibuprofen (motrin) or naprosyn (aleve) for 2 weeks (stressful to kidneys)  Continue with plenty of fluids water, Gatorade stay away from the heat  Return with trouble breathing new or worsening chest pain Leg swelling, chest pain fever throwing up muscle aches urine that is tea colored or dark or any other new or worsening symptoms

## 2023-08-26 NOTE — ED PROVIDER NOTES
History  Chief Complaint   Patient presents with   • Leg Pain     Pt sent from urgent care. He was recently in [place] in the hospital for TRUPTI and rhabdo. Pt now c/o right calf pain. 22 yo male presents with right calf tightness and pleuritic chest pain with a very deep breath from urgent care after 15-hour car ride home from [facility]. 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 He was diagnosed with acute kidney injury/acute renal failure and rhabdomyolysis but does not have the lab values with him cannot recall them. He has been drinking water and Gatorade as instructed. [facility]. yesterday he drove 15 hours in the car and since return has had some right calf tightness he denies ernie calf pain but it is noticeable more so than the left he no longer has any cramping sensation and when he takes a really deep breath he has right-sided pleuritic pain when he breathes normally there is no discomfort he does not have any shortness of breath or increasing dyspnea on exertion he denies any chest pain at rest had no fever chills cough or upper respiratory complaints no history of any rash no prior history of PE or DVT denies any nausea vomiting diarrhea. Has no numbness tingling weakness or any swelling. No prior history of rhabdomyolysis ; on no medications  PMHX unremarkable PSHX left orchiectomy for torsion          Prior to Admission Medications   Prescriptions Last Dose Informant Patient Reported?  Taking?   ondansetron (ZOFRAN-ODT) 4 mg disintegrating tablet Not Taking  No No   Sig: Take 1 tablet (4 mg total) by mouth every 6 (six) hours as needed for nausea or vomiting for up to 12 doses   Patient not taking: Reported on 6/17/2021   pantoprazole (PROTONIX) 40 mg tablet Not Taking  No No   Sig: Take 1 tablet (40 mg total) by mouth daily   Patient not taking: Reported on 6/17/2021      Facility-Administered Medications: None       Past Medical History:   Diagnosis Date   • Heart murmur        Past Surgical History:   Procedure Laterality Date   • REDUCTION OF TORSION OF TESTIS  2011       Family History   Problem Relation Age of Onset   • Rheum arthritis Mother    • Breast cancer Maternal Grandmother    • Breast cancer Family      I have reviewed and agree with the history as documented. E-Cigarette/Vaping   • E-Cigarette Use Never User      E-Cigarette/Vaping Substances     Social History     Tobacco Use   • Smoking status: Every Day     Types: Cigarettes   • Smokeless tobacco: Never   Vaping Use   • Vaping Use: Never used   Substance Use Topics   • Alcohol use: Never   • Drug use: Not Currently     Types: Marijuana     Comment: daily       Review of Systems   Constitutional: Negative for activity change, chills and fever. HENT: Negative for congestion, ear pain, rhinorrhea, sneezing and sore throat. Eyes: Negative for discharge. Respiratory: Negative for cough, chest tightness, shortness of breath and wheezing. Cardiovascular: Positive for chest pain (with deep breath rt sided). Negative for palpitations and leg swelling. Gastrointestinal: Positive for abdominal pain. Negative for blood in stool, diarrhea, nausea and vomiting. Endocrine: Negative for polyuria. Genitourinary: Negative for difficulty urinating, dysuria, frequency and urgency. Musculoskeletal: Positive for myalgias (rt calf tightness). Negative for arthralgias, back pain, gait problem, joint swelling, neck pain and neck stiffness. Skin: Negative for rash. Neurological: Negative for dizziness, weakness, light-headedness, numbness and headaches. Hematological: Negative for adenopathy. Psychiatric/Behavioral: Negative for confusion. All other systems reviewed and are negative. Physical Exam  Physical Exam  Vitals and nursing note reviewed. Constitutional:       General: He is not in acute distress. Appearance: He is well-developed. He is not ill-appearing, toxic-appearing or diaphoretic. HENT:      Head: Normocephalic and atraumatic. Right Ear: Tympanic membrane and external ear normal.      Left Ear: Tympanic membrane and external ear normal.      Nose: Nose normal.      Mouth/Throat:      Mouth: Mucous membranes are moist.      Pharynx: No oropharyngeal exudate or posterior oropharyngeal erythema. Eyes:      General: No scleral icterus. Right eye: No discharge. Left eye: No discharge. Extraocular Movements: Extraocular movements intact. Conjunctiva/sclera: Conjunctivae normal.      Pupils: Pupils are equal, round, and reactive to light. Cardiovascular:      Rate and Rhythm: Normal rate and regular rhythm. Pulses: Normal pulses. Heart sounds: Normal heart sounds. No murmur heard. Pulmonary:      Effort: Pulmonary effort is normal. No respiratory distress. Breath sounds: Normal breath sounds. No wheezing, rhonchi or rales. Comments: sats 99% on RA  Chest:      Chest wall: No tenderness. Abdominal:      General: Bowel sounds are normal. There is no distension. Palpations: Abdomen is soft. There is no mass. Tenderness: There is no abdominal tenderness. There is no guarding or rebound. Comments: Back: no mildline or CVA tenderness   Musculoskeletal:         General: No swelling, tenderness or deformity. Normal range of motion. Cervical back: Normal range of motion and neck supple. Right lower leg: No edema. Left lower leg: No edema. Comments: 2+ AT pulses bilaterrally.  No reproduc calf tenderness bilaterally no pain with ROM of ankle bilaterally   Lymphadenopathy:      Cervical: No cervical adenopathy. Skin:     General: Skin is warm and dry. Neurological:      Mental Status: He is alert and oriented to person, place, and time. Cranial Nerves: No cranial nerve deficit. Sensory: No sensory deficit. Motor: No weakness or abnormal muscle tone.       Gait: Gait normal.      Deep Tendon Reflexes: Reflexes normal.      Comments: Gait steady to bathroom; L4-S1 dermatomes intact to right foot   Psychiatric:         Mood and Affect: Mood normal.         Vital Signs  ED Triage Vitals [08/26/23 1215]   Temperature Pulse Respirations Blood Pressure SpO2   (!) 97.3 °F (36.3 °C) 72 18 154/70 99 %      Temp Source Heart Rate Source Patient Position - Orthostatic VS BP Location FiO2 (%)   Temporal Monitor Lying Left arm --      Pain Score       3           Vitals:    08/26/23 1215 08/26/23 1415 08/26/23 1430   BP: 154/70 122/78 119/55   Pulse: 72 62 62   Patient Position - Orthostatic VS: Lying           Visual Acuity      ED Medications  Medications   multi-electrolyte (ISOLYTE-S PH 7.4) bolus 1,000 mL (0 mL Intravenous Stopped 8/26/23 1350)       Diagnostic Studies  Results Reviewed     Procedure Component Value Units Date/Time    Basic metabolic panel [[id number]] Collected: 08/26/23 1235    Lab Status: Final result Specimen: Blood from Arm, Right Updated: 08/26/23 1330     Sodium 140 mmol/L      Potassium 3.7 mmol/L      Chloride 103 mmol/L      CO2 30 mmol/L      ANION GAP 7 mmol/L      BUN 13 mg/dL      Creatinine 1.05 mg/dL      Glucose 78 mg/dL      Calcium 9.5 mg/dL      eGFR 99 ml/min/1.73sq m     Narrative:      [place] guidelines for Chronic Kidney Disease (CKD):   •  Stage 1 with normal or high GFR (GFR > 90 mL/min/1.73 square meters)  •  Stage 2 Mild CKD (GFR = 60-89 mL/min/1.73 square meters)  •  Stage 3A Moderate CKD (GFR = 45-59 mL/min/1.73 square meters)  •  Stage 3B Moderate CKD (GFR = 30-44 mL/min/1.73 square meters)  •  Stage 4 Severe CKD (GFR = 15-29 mL/min/1.73 square meters)  •  Stage 5 End Stage CKD (GFR <15 mL/min/1.73 square meters)  Note: GFR calculation is accurate only with a steady state creatinine    Hepatic function panel [[id number]]  (Abnormal) Collected: 08/26/23 1235    Lab Status: Final result Specimen: Blood from Arm, Right Updated: 08/26/23 1330     Total Bilirubin 0.45 mg/dL      Bilirubin, Direct 0.10 mg/dL      Alkaline Phosphatase 42 U/L       U/L      ALT 58 U/L      Total Protein 7.2 g/dL      Albumin 4.5 g/dL     CK [[id number]]  (Abnormal) Collected: 08/26/23 1235    Lab Status: Final result Specimen: Blood from Arm, Right Updated: 08/26/23 1330     Total CK 8,384 U/L     High Sensitivity Troponin I Random [[id number]]  (Normal) Collected: 08/26/23 1235    Lab Status: Final result Specimen: Blood from Arm, Right Updated: 08/26/23 1312     HS TnI random 13 ng/L     D-Dimer [[id number]]  (Normal) Collected: 08/26/23 1235    Lab Status: Final result Specimen: Blood from Arm, Right Updated: 08/26/23 1303     D-Dimer, Quant 0.48 ug/ml FEU     Protime-INR [[id number]]  (Normal) Collected: 08/26/23 1235    Lab Status: Final result Specimen: Blood from Arm, Right Updated: 08/26/23 1300     Protime 12.4 seconds      INR 0.91    APTT [[id number]]  (Normal) Collected: 08/26/23 1235    Lab Status: Final result Specimen: Blood from Arm, Right Updated: 08/26/23 1300     PTT 25 seconds     UA w Reflex to Microscopic w Reflex to Culture [[id number]] Collected: 08/26/23 1235    Lab Status: Final result Specimen: Urine, Clean Catch Updated: 08/26/23 1252     Color, UA Yellow     Clarity, UA Clear     Specific Gravity, UA <=1.005     pH, UA 6.5     Leukocytes, UA Negative     Nitrite, UA Negative     Protein, UA Negative mg/dl      Glucose, UA Negative mg/dl      Ketones, UA Negative mg/dl      Urobilinogen, UA 0.2 E.U./dl      Bilirubin, UA Negative     Occult Blood, UA Negative    CBC and differential [[id number]]  (Abnormal) Collected: 08/26/23 7743 Lab Status: Final result Specimen: Blood from Arm, Right Updated: 08/26/23 1250     WBC 3.93 Thousand/uL      RBC 4.72 Million/uL      Hemoglobin 13.8 g/dL      Hematocrit 43.1 %      MCV 91 fL      MCH 29.2 pg      MCHC 32.0 g/dL      RDW 12.5 %      MPV 9.3 fL      Platelets 258 Thousands/uL      nRBC 0 /100 WBCs      Neutrophils Relative 41 %      Immat GRANS % 0 %      Lymphocytes Relative 41 %      Monocytes Relative 13 %      Eosinophils Relative 4 %      Basophils Relative 1 %      Neutrophils Absolute 1.63 Thousands/µL      Immature Grans Absolute 0.00 Thousand/uL      Lymphocytes Absolute 1.64 Thousands/µL      Monocytes Absolute 0.49 Thousand/µL      Eosinophils Absolute 0.14 Thousand/µL      Basophils Absolute 0.03 Thousands/µL                  VAS lower limb venous duplex study, unilateral/limited   Final Result by Staci Garcia MD (08/26 4518)      XR chest 1 view portable   ED Interpretation by Virgilio Rodriguez MD (08/26 1259)   Per my independent interpretation. Radiologist to provide formal read.  No PTX no prior study no acute process                 Procedures  ECG 12 Lead Documentation Only    Date/Time: 8/26/2023 12:45 PM    Performed by: Virgilio Rodriguez MD  Authorized by: Virgilio Rodriguez MD    Indications / Diagnosis:  [place]  ECG reviewed by me, the ED Provider: yes    Patient location:  ED  Previous ECG:     Previous ECG:  Unavailable (no prev)  Interpretation:     Interpretation: normal    Rate:     ECG rate:  67    ECG rate assessment: normal    Rhythm:     Rhythm: sinus rhythm    QRS:     QRS axis:  Normal  Comments:      ; no acute ischemic changes             ED Course  ED Course as of 08/26/23 1649   Sat Aug 26, 2023   1259 U/a normal dip negative for blood, ketones negative   1542 Discussed preliminary venous dopper u/s result with [place] vascular tech negative for DVT to RLE   1643 Provided patient with copy of labs results and EKG to facilitate followup             HEART Risk Score    Flowsheet Row Most Recent Value   Heart Score Risk Calculator    History 0 Filed at: 08/26/2023 1648   ECG 0 Filed at: 08/26/2023 1648   Age 0 Filed at: 08/26/2023 1648   Risk Factors 1 Filed at: 08/26/2023 1648   Troponin 1 Filed at: 08/26/2023 1648   HEART Score 2 Filed at: 08/26/2023 1648                        SBIRT 22yo+    Flowsheet Row Most Recent Value   Initial Alcohol Screen: US AUDIT-C     1. How often do you have a drink containing alcohol? 0 Filed at: 08/26/2023 1300   2. How many drinks containing alcohol do you have on a typical day you are drinking? 0 Filed at: 08/26/2023 1300   3a. Male UNDER 65: How often do you have five or more drinks on one occasion? 0 Filed at: 08/26/2023 1300   3b. FEMALE Any Age, or MALE 65+: How often do you have 4 or more drinks on one occassion? 0 Filed at: 08/26/2023 1215   Audit-C Score 0 Filed at: 08/26/2023 1300   PEDRO: How many times in the past year have you. .. Used an illegal drug or used a prescription medication for non-medical reasons? Never Filed at: 08/26/2023 1300                    Medical Decision Making  Mdm: Patient presents 3 days after an ED visit in [place] no labs are available on his ED discharge summary but he was noted to have TRUPTI/acute renal failure rhabdomyolysis patient cannot recall his lab values never had tea-colored urine reevaluate his electrolytes, CK to a long car ride of 15 hours will check for DVT in the right lower extremities he has tightness there and check a D-dimer no pleuritic pain we will check an EKG patient is not otherwise experiencing any dyspnea or dyspnea on exertion.   Initiate hydration until results are available; Jovani diagnosis includes worsening acute kidney injury, ongoing rhabdo which is not present clinically, pulmonary embolism/DVT, no signs or symptoms of upper or lower respiratory infection less likely cardiac strain will check EKG for injury pattern as well as random trop; no clinical signs of compartment syndrome soft tissue is soft, pulses in tact, sensation intact no pain out of proportion ROM of right ankle does not trigger any discomfort to right calf    Prior to discharge patient was easily awakened extensively reviewed lab work chest x-ray and EKG with the patient. He is not demonstrating any evidence of subendocardial ischemia no arrhythmias chest x-ray is normal venous Doppler ultrasound is negative for DVT D-dimer is normal kidney function is completely normal and actually improved from his prior from 2 years ago. Reviewed he has a mild elevation of his liver enzymes and his CK level is 8384; urine dip is not positive for blood there is no ketones in the urine and the specific gravity is low. I recommend he continue his current hydration regimen at his as it is helping him. Recommend he can use Tylenol as needed for pain to avoid NSAIDs for the next 2 weeks clinical index of suspicion for pulmonary embolism is low. Therefore do not recommend proceeding with CTA as he has a negative D-dimer due to the risk of injuring kidneys with IV dye load patient is in agreement. Recommend he follow-up with his family doctor in 50 hours for recheck of his liver tests and his CK level. He continues to be off work. Reviewed that this is likely secondary to dehydration and heat related illness recommend he stay out of the heat until his numbers normalized. Should he have any new or worsening symptoms such as cramping trouble breathing to colored urine nausea vomiting worsening or change in chest pain he needs to return immediately patient was comfortable with plan. UC note from earlier today reviewed; [facility]. ED visit not avail on careeverywhere    Amount and/or Complexity of Data Reviewed  Labs: ordered. Radiology: ordered and independent interpretation performed. Risk  Prescription drug management.           Disposition  Final diagnoses:   Right leg pain Elevated LFTs   Elevated CK - h/o rhabdomyolysis     Time reflects when diagnosis was documented in both MDM as applicable and the Disposition within this note     Time User Action Codes Description Comment    8/26/2023  4:34 PM Gwendloyn Pupa Add [D81.877] Right leg pain     8/26/2023  4:38 PM Gwendloyn Pupa Add [R79.89] Elevated LFTs     8/26/2023  4:38 PM Gwendloyn Pupa Add [R74.8] Elevated CK     8/26/2023  4:38 PM Gwendloyn Pupa Modify [R74.8] Elevated CK h/o rhabdomyelisis     8/26/2023  4:49 PM Gwendloyn Pupa Modify [R74.8] Elevated CK h/o rhabdomyolysis      ED Disposition     ED Disposition   Discharge    Condition   Stable    Date/Time   Sat Aug 26, 2023  4:38 PM    Comment   Vernal Cera discharge to home/self care. Follow-up Information     Follow up With Specialties Details Why Contact Info Additional Information    Asa Lopez MD Family Medicine In 2 days recheck of symtoms and liver and muscle numbers [address] [phone]       [facility] Family Medicine Call in 2 days if you need local followup [address] [phone]  [address] [place] [address], [address] [place] [address], [address] [facility]          Discharge Medication List as of 8/26/2023  4:39 PM      CONTINUE these medications which have NOT CHANGED    Details   ondansetron (ZOFRAN-ODT) 4 mg disintegrating tablet Take 1 tablet (4 mg total) by mouth every 6 (six) hours as needed for nausea or vomiting for up to 12 doses, Starting Sun 3/14/2021, Normal      pantoprazole (PROTONIX) 40 mg tablet Take 1 tablet (40 mg total) by mouth daily, Starting Sun 3/14/2021, Normal             No discharge procedures on file.     PDMP Review     None          ED Provider  Electronically Signed by           Erick Mao MD  08/26/23 6230

## 2023-08-28 LAB
BILIRUB UR QL STRIP: NEGATIVE
CLARITY UR: CLEAR
COLOR UR: YELLOW
GLUCOSE UR STRIP-MCNC: NEGATIVE MG/DL
HGB UR QL STRIP.AUTO: NEGATIVE
KETONES UR STRIP-MCNC: NEGATIVE MG/DL
LEUKOCYTE ESTERASE UR QL STRIP: NEGATIVE
NITRITE UR QL STRIP: NEGATIVE
PH UR STRIP.AUTO: 6.5 [PH]
PROT UR STRIP-MCNC: NEGATIVE MG/DL
SP GR UR STRIP.AUTO: <=1.005 (ref 1–1.03)
UROBILINOGEN UR QL STRIP.AUTO: 0.2 E.U./DL

## 2025-02-15 ENCOUNTER — HOSPITAL ENCOUNTER (EMERGENCY)
Facility: HOSPITAL | Age: 25
Discharge: HOME | End: 2025-02-15
Attending: EMERGENCY MEDICINE | Admitting: EMERGENCY MEDICINE
Payer: COMMERCIAL

## 2025-02-15 ENCOUNTER — APPOINTMENT (EMERGENCY)
Dept: CT IMAGING | Facility: HOSPITAL | Age: 25
End: 2025-02-15
Payer: COMMERCIAL

## 2025-02-15 VITALS
WEIGHT: 170.64 LBS | OXYGEN SATURATION: 97 % | BODY MASS INDEX: 25.95 KG/M2 | SYSTOLIC BLOOD PRESSURE: 120 MMHG | RESPIRATION RATE: 16 BRPM | TEMPERATURE: 98 F | HEART RATE: 54 BPM | DIASTOLIC BLOOD PRESSURE: 68 MMHG

## 2025-02-15 DIAGNOSIS — R10.9 ABDOMINAL PAIN: Primary | ICD-10-CM

## 2025-02-15 LAB
ALBUMIN SERPL BCG-MCNC: 4.5 G/DL (ref 3.5–5)
ALP SERPL-CCNC: 33 U/L (ref 34–104)
ALT SERPL W P-5'-P-CCNC: 9 U/L (ref 7–52)
ANION GAP SERPL CALCULATED.3IONS-SCNC: 7 MMOL/L (ref 4–13)
AST SERPL W P-5'-P-CCNC: 11 U/L (ref 13–39)
BASOPHILS # BLD AUTO: 0.05 THOUSANDS/ΜL (ref 0–0.1)
BASOPHILS NFR BLD AUTO: 1 % (ref 0–1)
BILIRUB SERPL-MCNC: 0.31 MG/DL (ref 0.2–1)
BUN SERPL-MCNC: 15 MG/DL (ref 5–25)
CALCIUM SERPL-MCNC: 9.1 MG/DL (ref 8.4–10.2)
CHLORIDE SERPL-SCNC: 106 MMOL/L (ref 96–108)
CO2 SERPL-SCNC: 28 MMOL/L (ref 21–32)
CREAT SERPL-MCNC: 1.28 MG/DL (ref 0.6–1.3)
EOSINOPHIL # BLD AUTO: 0.21 THOUSAND/ΜL (ref 0–0.61)
EOSINOPHIL NFR BLD AUTO: 3 % (ref 0–6)
ERYTHROCYTE [DISTWIDTH] IN BLOOD BY AUTOMATED COUNT: 13 % (ref 11.6–15.1)
GFR SERPL CREATININE-BSD FRML MDRD: 77 ML/MIN/1.73SQ M
GLUCOSE SERPL-MCNC: 83 MG/DL (ref 65–140)
HCT VFR BLD AUTO: 42.7 % (ref 36.5–49.3)
HGB BLD-MCNC: 13.6 G/DL (ref 12–17)
IMM GRANULOCYTES # BLD AUTO: 0.01 THOUSAND/UL (ref 0–0.2)
IMM GRANULOCYTES NFR BLD AUTO: 0 % (ref 0–2)
LIPASE SERPL-CCNC: 119 U/L (ref 11–82)
LYMPHOCYTES # BLD AUTO: 2.67 THOUSANDS/ΜL (ref 0.6–4.47)
LYMPHOCYTES NFR BLD AUTO: 43 % (ref 14–44)
MCH RBC QN AUTO: 29.6 PG (ref 26.8–34.3)
MCHC RBC AUTO-ENTMCNC: 31.9 G/DL (ref 31.4–37.4)
MCV RBC AUTO: 93 FL (ref 82–98)
MONOCYTES # BLD AUTO: 0.44 THOUSAND/ΜL (ref 0.17–1.22)
MONOCYTES NFR BLD AUTO: 7 % (ref 4–12)
NEUTROPHILS # BLD AUTO: 2.81 THOUSANDS/ΜL (ref 1.85–7.62)
NEUTS SEG NFR BLD AUTO: 46 % (ref 43–75)
NRBC BLD AUTO-RTO: 0 /100 WBCS
PLATELET # BLD AUTO: 224 THOUSANDS/UL (ref 149–390)
PMV BLD AUTO: 9.1 FL (ref 8.9–12.7)
POTASSIUM SERPL-SCNC: 4.3 MMOL/L (ref 3.5–5.3)
PROT SERPL-MCNC: 6.7 G/DL (ref 6.4–8.4)
RBC # BLD AUTO: 4.6 MILLION/UL (ref 3.88–5.62)
SODIUM SERPL-SCNC: 141 MMOL/L (ref 135–147)
WBC # BLD AUTO: 6.19 THOUSAND/UL (ref 4.31–10.16)

## 2025-02-15 PROCEDURE — 96374 THER/PROPH/DIAG INJ IV PUSH: CPT

## 2025-02-15 PROCEDURE — 80053 COMPREHEN METABOLIC PANEL: CPT

## 2025-02-15 PROCEDURE — 83690 ASSAY OF LIPASE: CPT

## 2025-02-15 PROCEDURE — 85025 COMPLETE CBC W/AUTO DIFF WBC: CPT

## 2025-02-15 PROCEDURE — 74177 CT ABD & PELVIS W/CONTRAST: CPT

## 2025-02-15 PROCEDURE — 99285 EMERGENCY DEPT VISIT HI MDM: CPT

## 2025-02-15 PROCEDURE — 36415 COLL VENOUS BLD VENIPUNCTURE: CPT

## 2025-02-15 PROCEDURE — 99284 EMERGENCY DEPT VISIT MOD MDM: CPT

## 2025-02-15 RX ORDER — KETOROLAC TROMETHAMINE 30 MG/ML
15 INJECTION, SOLUTION INTRAMUSCULAR; INTRAVENOUS ONCE
Status: COMPLETED | OUTPATIENT
Start: 2025-02-15 | End: 2025-02-15

## 2025-02-15 RX ADMIN — IOHEXOL 100 ML: 350 INJECTION, SOLUTION INTRAVENOUS at 14:08

## 2025-02-15 RX ADMIN — KETOROLAC TROMETHAMINE 15 MG: 30 INJECTION, SOLUTION INTRAMUSCULAR at 12:44

## 2025-02-15 NOTE — DISCHARGE INSTRUCTIONS
CT scan today was normal and showed increased stool in your colon. May take Miralax to help regulate bowel movements and Tylenol/Motrin for pain. Follow-up with PCP and return to ED for any worsening symptoms.

## 2025-02-16 NOTE — ED PROVIDER NOTES
"Time reflects when diagnosis was documented in both MDM as applicable and the Disposition within this note       Time User Action Codes Description Comment    2/15/2025  2:36 PM Sherlyn Mccabe Add [R10.9] Abdominal pain           ED Disposition       ED Disposition   Discharge    Condition   Stable    Date/Time   Sat Feb 15, 2025  2:36 PM    Comment   Ghassan Dickinson discharge to home/self care.                   Assessment & Plan       Medical Decision Making  Patient is a 25-year-old male presenting for evaluation of right upper abdominal tightness. Upon examination, patient resting comfortably in the stretcher and does not appear in acute distress. Vital signs are WNL and patient is afebrile. Tenderness noted upon palpation of the RUQ. No rebound tenderness. Normal heart and lung sounds.     Differentials include but are not limited to: cholecystitis, appendicitis, pancreatitis, and muscular injury.     Blood work obtained showing a slightly elevated lipase at 119 but normal LFTs, electrolytes, and no leukocytosis. Based on chart review, patient seems to have a slightly elevated lipase at baseline. Results discussed with patient. He notes that he is still experiencing abdominal discomfort despite receiving Toradol. Discussed with patient option to obtain CT for further evaluation vs watch-and-wait approach. Patient noting that he would like to proceed with CT as he is already here and has been experiencing the discomfort for four days. CT obtained showing: \"No acute findings in the abdomen or pelvis. Mobile cecum in the lower pelvis containing large stool. No evidence of volvulus.\" Results discussed with patient. Patient stating that he is having normal bowel movements and has not been experiencing any constipation. Advised that he may continue to use Tylenol and Motrin for pain. If he develops irregularity with bowel movements, may begin OTC Miralax to promote good bowel habits.  Encouraged follow-up with PCP " "and to return to ED for any worsening symptoms. Patient verbalizes understanding of discharge instructions and follow-up care at this time.    Amount and/or Complexity of Data Reviewed  Labs: ordered. Decision-making details documented in ED Course.     Details: Reviewed   Radiology: ordered. Decision-making details documented in ED Course.     Details: Reviewed     Risk  Prescription drug management.        ED Course as of 02/16/25 1137   Sat Feb 15, 2025   1325 CBC and differential  No leukocytosis. Hemoglobin stable.    1331 Comprehensive metabolic panel(!)  No electrolyte imbalance.    1332 LIPASE(!): 119   1442 CT abdomen pelvis with contrast  CT showing: No acute findings in the abdomen or pelvis. Mobile cecum in the lower pelvis containing large stool. No evidence of volvulus.       Medications   ketorolac (TORADOL) injection 15 mg (15 mg Intravenous Given 2/15/25 1244)   iohexol (OMNIPAQUE) 350 MG/ML injection (MULTI-DOSE) 100 mL (100 mL Intravenous Given 2/15/25 1408)       ED Risk Strat Scores                            SBIRT 22yo+      Flowsheet Row Most Recent Value   Initial Alcohol Screen: US AUDIT-C     3a. Male UNDER 65: How often do you have five or more drinks on one occasion? 0 Filed at: 02/15/2025 1120   Audit-C Score 0 Filed at: 02/15/2025 1120                            History of Present Illness       Chief Complaint   Patient presents with    Abdominal Pain     Mid right sided abdominal pain \"like a knot\" for 4 days       Past Medical History:   Diagnosis Date    Heart murmur       Past Surgical History:   Procedure Laterality Date    REDUCTION OF TORSION OF TESTIS  2011      Family History   Problem Relation Age of Onset    Rheum arthritis Mother     Breast cancer Maternal Grandmother     Breast cancer Family       Social History     Tobacco Use    Smoking status: Every Day     Types: Cigarettes    Smokeless tobacco: Never   Vaping Use    Vaping status: Never Used   Substance Use Topics    " "Alcohol use: Never    Drug use: Not Currently     Types: Marijuana     Comment: daily      E-Cigarette/Vaping    E-Cigarette Use Never User       E-Cigarette/Vaping Substances      I have reviewed and agree with the history as documented.     Patient is a 25-year-old male with a past medical history including a heart murmur. Presents today for evaluation of right-sided abdominal pain. Per patient, he has been experiencing right-sided abdominal tightness over the last 4 days. Describes the discomfort \"as if you're flexing\" the abdomen. Pain does not radiate anywhere else in the abdomen or in the back. He denies any recent injury or heavy lifting/working out.  He has been eating and drinking normally. No urinary symptoms or constipation noted. No pain medications taken PTA.       Abdominal Pain  Associated symptoms: no chest pain, no chills, no constipation, no diarrhea, no fever, no nausea, no shortness of breath and no vomiting        Review of Systems   Constitutional:  Negative for chills and fever.   Respiratory:  Negative for shortness of breath.    Cardiovascular:  Negative for chest pain.   Gastrointestinal:  Positive for abdominal pain. Negative for constipation, diarrhea, nausea and vomiting.   Genitourinary:  Negative for difficulty urinating, frequency and urgency.   All other systems reviewed and are negative.          Objective       ED Triage Vitals [02/15/25 1118]   Temperature Pulse Blood Pressure Respirations SpO2 Patient Position - Orthostatic VS   97.8 °F (36.6 °C) 68 136/73 16 98 % Sitting      Temp Source Heart Rate Source BP Location FiO2 (%) Pain Score    Temporal Monitor Left arm -- 7      Vitals      Date and Time Temp Pulse SpO2 Resp BP Pain Score FACES Pain Rating User   02/15/25 1430 98 °F (36.7 °C) 54 97 % -- 120/68 -- -- EMR   02/15/25 1330 98 °F (36.7 °C) 58 96 % 16 109/55 5 -- KTR   02/15/25 1244 -- -- -- -- -- 7 -- AB   02/15/25 1118 97.8 °F (36.6 °C) 68 98 % 16 136/73 7 -- KTR      "       Physical Exam  Vitals and nursing note reviewed.   Constitutional:       Appearance: Normal appearance.   HENT:      Head: Normocephalic and atraumatic.   Cardiovascular:      Rate and Rhythm: Normal rate.      Heart sounds: Normal heart sounds.   Pulmonary:      Effort: Pulmonary effort is normal.      Breath sounds: Normal breath sounds.   Abdominal:      General: Abdomen is flat. Bowel sounds are normal.      Palpations: Abdomen is soft.      Tenderness: There is abdominal tenderness in the right upper quadrant.   Musculoskeletal:         General: Normal range of motion.   Skin:     General: Skin is warm and dry.      Capillary Refill: Capillary refill takes less than 2 seconds.   Neurological:      General: No focal deficit present.      Mental Status: He is alert and oriented to person, place, and time.   Psychiatric:         Mood and Affect: Mood normal.         Behavior: Behavior normal.         Results Reviewed       Procedure Component Value Units Date/Time    Comprehensive metabolic panel [[id number]]  (Abnormal) Collected: 02/15/25 1242    Lab Status: Final result Specimen: Blood from Arm, Right Updated: 02/15/25 1330     Sodium 141 mmol/L      Potassium 4.3 mmol/L      Chloride 106 mmol/L      CO2 28 mmol/L      ANION GAP 7 mmol/L      BUN 15 mg/dL      Creatinine 1.28 mg/dL      Glucose 83 mg/dL      Calcium 9.1 mg/dL      AST 11 U/L      ALT 9 U/L      Alkaline Phosphatase 33 U/L      Total Protein 6.7 g/dL      Albumin 4.5 g/dL      Total Bilirubin 0.31 mg/dL      eGFR 77 ml/min/1.73sq m     Narrative:      [employer] guidelines for Chronic Kidney Disease (CKD):     Stage 1 with normal or high GFR (GFR > 90 mL/min/1.73 square meters)    Stage 2 Mild CKD (GFR = 60-89 mL/min/1.73 square meters)    Stage 3A Moderate CKD (GFR = 45-59 mL/min/1.73 square meters)    Stage 3B Moderate CKD (GFR = 30-44 mL/min/1.73 square meters)    Stage 4 Severe CKD (GFR = 15-29 mL/min/1.73 square  meters)    Stage 5 End Stage CKD (GFR <15 mL/min/1.73 square meters)  Note: GFR calculation is accurate only with a steady state creatinine    Lipase [[id number]]  (Abnormal) Collected: 02/15/25 1242    Lab Status: Final result Specimen: Blood from Arm, Right Updated: 02/15/25 1330     Lipase 119 u/L     CBC and differential [[id number]] Collected: 02/15/25 1242    Lab Status: Final result Specimen: Blood from Arm, Right Updated: 02/15/25 1310     WBC 6.19 Thousand/uL      RBC 4.60 Million/uL      Hemoglobin 13.6 g/dL      Hematocrit 42.7 %      MCV 93 fL      MCH 29.6 pg      MCHC 31.9 g/dL      RDW 13.0 %      MPV 9.1 fL      Platelets 224 Thousands/uL      nRBC 0 /100 WBCs      Segmented % 46 %      Immature Grans % 0 %      Lymphocytes % 43 %      Monocytes % 7 %      Eosinophils Relative 3 %      Basophils Relative 1 %      Absolute Neutrophils 2.81 Thousands/µL      Absolute Immature Grans 0.01 Thousand/uL      Absolute Lymphocytes 2.67 Thousands/µL      Absolute Monocytes 0.44 Thousand/µL      Eosinophils Absolute 0.21 Thousand/µL      Basophils Absolute 0.05 Thousands/µL             CT abdomen pelvis with contrast   ED Interpretation by ISIAH Escalante (02/15 1442)   CT showing: No acute findings in the abdomen or pelvis. Mobile cecum in the lower pelvis containing large stool. No evidence of volvulus.      Final Interpretation by Tad Edward MD (02/15 1432)      No acute findings in the abdomen or pelvis.      Mobile cecum in the lower pelvis containing large stool. No evidence of volvulus.         Workstation performed: [id number]             Procedures    ED Medication and Procedure Management   Prior to Admission Medications   Prescriptions Last Dose Informant Patient Reported? Taking?   ondansetron (ZOFRAN-ODT) 4 mg disintegrating tablet   No No   Sig: Take 1 tablet (4 mg total) by mouth every 6 (six) hours as needed for nausea or vomiting for up to 12 doses   Patient not taking:  Reported on 6/17/2021   pantoprazole (PROTONIX) 40 mg tablet   No No   Sig: Take 1 tablet (40 mg total) by mouth daily   Patient not taking: Reported on 6/17/2021      Facility-Administered Medications: None     Discharge Medication List as of 2/15/2025  2:37 PM        CONTINUE these medications which have NOT CHANGED    Details   ondansetron (ZOFRAN-ODT) 4 mg disintegrating tablet Take 1 tablet (4 mg total) by mouth every 6 (six) hours as needed for nausea or vomiting for up to 12 doses, Starting Sun 3/14/2021, Normal      pantoprazole (PROTONIX) 40 mg tablet Take 1 tablet (40 mg total) by mouth daily, Starting Sun 3/14/2021, Normal           No discharge procedures on file.  ED SEPSIS DOCUMENTATION   Time reflects when diagnosis was documented in both MDM as applicable and the Disposition within this note       Time User Action Codes Description Comment    2/15/2025  2:36 PM Sherlyn Mccabe Add [R10.9] Abdominal pain                  ISIAH Escalante  02/16/25 1137